# Patient Record
(demographics unavailable — no encounter records)

---

## 2024-11-12 NOTE — HISTORY OF PRESENT ILLNESS
[FreeTextEntry1] : 66 yo M presents for follow up  PMH HIV, h/o asthma/reactive airways Meds atorvastatin, Dovato, finasteride, losartan PSH hernia repair, ptosis repair  Last colonoscopy ~2019 w/ GI. One polyp possibly removed and recommended to repeat in 5 years  h/o HRA x 2 in [place] ~ 9 years ago w/ Dr. Mace at [facility], with JEFF reported on one HRA procedures.  Initially seen 10/5/23 for anal pap and exam.  Exam, including anoscopy, noted small right anterior external hemorrhoid, mild internal hemorrhoids, no visible masses or lesions in anal canal Anal Pap: NEGATIVE FOR INTRAEPITHELIAL LESION OR MALIGNANCY.  Patient presents today for follow up. Overall no complaints today.  Patient last checked viral load was 01/2024, checks it every year, VL undetectable, compliant with medication.  Moving bowels daily w/o use of any stool softeners/fiber  States he continues to follow with Dr Arrieta and has anoscopy performed every 6 months. Most recent anoscopy was last week wtih Dr Arrieta and per patient it was normal; Patient states he is here for anal pap smear as Dr Arrieta's office does not perform anal pap smears.

## 2024-11-12 NOTE — HISTORY OF PRESENT ILLNESS
[FreeTextEntry1] : 66 yo M presents for follow up  PMH HIV, h/o asthma/reactive airways Meds atorvastatin, Dovato, finasteride, losartan PSH hernia repair, ptosis repair  Last colonoscopy ~2019 w/ GI. One polyp possibly removed and recommended to repeat in 5 years  h/o HRA x 2 in [place] ~ 9 years ago w/ Dr. Mace at [facility], with JEFF reported on one HRA procedures.  Initially seen 10/5/23 for anal pap and exam.  Exam, including anoscopy, noted small right anterior external hemorrhoid, mild internal hemorrhoids, no visible masses or lesions in anal canal Anal Pap: NEGATIVE FOR INTRAEPITHELIAL LESION OR MALIGNANCY.  Patient presents today for follow up. Overall no complaints today.  Patient last checked viral load was 01/2024, checks it every year, VL undetectable, compliant with medication.  Moving bowels daily w/o use of any stool softeners/fiber  States he continues to follow with Dr Arrieta and has anoscopy performed every 6 months. Most recent anoscopy was last week wtih Dr Arrieta and per patient it was normal; Patient states he is here for anal pap smear as Dr Arrieta's office does not perform anal pap smears.

## 2024-11-12 NOTE — ASSESSMENT
[FreeTextEntry1] : Continue surveillance.  Patient sees [facility] Dr Arrieta for anoscopy - most recently completed within past week per patient without abnormalities reported. Anal pap smear performed today. Further recommendations pending review of cytology. All questions answered.

## 2024-11-12 NOTE — PHYSICAL EXAM
[FreeTextEntry1] : Medical assistant present for duration of physical examination  General no acute distress, alert and oriented Psych in good spirit, responding appropriately to questions Nonlabored breathing Ambulating without assistance Skin normal color and pigment, no visible lesions or rashes  Anorectal Exam: Inspection no erythema, induration or fluctuance, no skin excoriation, no fissure, soft external hemorrhoids, no visible lesions of perianal skin   Anal pap smear performed.   Patient tolerated examination well.